# Patient Record
Sex: MALE | Race: WHITE | Employment: STUDENT | URBAN - METROPOLITAN AREA
[De-identification: names, ages, dates, MRNs, and addresses within clinical notes are randomized per-mention and may not be internally consistent; named-entity substitution may affect disease eponyms.]

---
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
## 2020-03-19 DIAGNOSIS — F90.0 ADHD (ATTENTION DEFICIT HYPERACTIVITY DISORDER), INATTENTIVE TYPE: Primary | ICD-10-CM

## 2020-03-19 NOTE — TELEPHONE ENCOUNTER
Patient is calling requesting a refill on the following med. Pharm on file verified.      Requested Prescriptions     Pending Prescriptions Disp Refills    dextroamphetamine-amphetamine (ADDERALL) 20 mg tablet 45 Tab 0     Sig: Take 1/2 to 1 tablet once or twice daily for ADHD symptoms

## 2020-03-23 RX ORDER — DEXTROAMPHETAMINE SACCHARATE, AMPHETAMINE ASPARTATE, DEXTROAMPHETAMINE SULFATE AND AMPHETAMINE SULFATE 5; 5; 5; 5 MG/1; MG/1; MG/1; MG/1
TABLET ORAL
Qty: 30 TAB | Refills: 0 | Status: SHIPPED | OUTPATIENT
Start: 2020-03-23

## 2020-03-23 NOTE — TELEPHONE ENCOUNTER
Thanks. Advise pt he failed to show for his follow up and that I wanted to see him on the new dose because we made a change and he was also having some wt loss on this medication. Advise I do need to see him.  The good thing is he apparently doesn't take it much because he has not filled it since . (DAISY, note to self: I sent in smaller supply for now w/ adjusted sig this time until I can evaluate at next appt)

## 2020-03-23 NOTE — TELEPHONE ENCOUNTER
No problem on , last filled 11/14/19, LOV 11/14/19, due f/u in 4 weeks and FTS on 12/17/19.   last UDS 10/15/19, CSA same day

## 2020-04-20 ENCOUNTER — VIRTUAL VISIT (OUTPATIENT)
Dept: FAMILY MEDICINE CLINIC | Age: 20
End: 2020-04-20

## 2020-04-20 VITALS — WEIGHT: 141 LBS | BODY MASS INDEX: 20.23 KG/M2

## 2020-04-20 DIAGNOSIS — Z51.81 ENCOUNTER FOR MEDICATION MONITORING: ICD-10-CM

## 2020-04-20 DIAGNOSIS — F90.0 ADHD (ATTENTION DEFICIT HYPERACTIVITY DISORDER), INATTENTIVE TYPE: Primary | ICD-10-CM

## 2020-04-20 NOTE — PATIENT INSTRUCTIONS
Attention Deficit Hyperactivity Disorder (ADHD) in Adults: Care Instructions Your Care Instructions Attention deficit hyperactivity disorder, or ADHD, is a condition that makes it hard to pay attention. So you may have problems when you try to focus, get organized, and finish tasks. It might make you more active than other people. Or you might do things without thinking first. 
ADHD is very common. It usually starts in early childhood. Many adults don't realize they have it until their children are diagnosed. Then they become aware of their own symptoms. Doctors don't know what causes ADHD. But it often runs in families. ADHD can be treated with medicines, behavior training, and counseling. Treatment can improve your life. Follow-up care is a key part of your treatment and safety. Be sure to make and go to all appointments, and call your doctor if you are having problems. It's also a good idea to know your test results and keep a list of the medicines you take. How can you care for yourself at home? · Learn all you can about ADHD. This will help you and your family understand it better. · Take your medicines exactly as prescribed. Call your doctor if you think you are having a problem with your medicine. You will get more details on the specific medicines your doctor prescribes. · If you miss a dose of your medicine, do not take an extra dose. · If your doctor suggests counseling, find a counselor you like and trust. Talk openly and honestly. Be willing to make some changes. · Find a support group for adults with ADHD. Talking to others with the same problems can help you feel better. It can also give you ideas about how to best cope with the condition. · Get rid of distractions at your work space. Keep your desk clean. Try not to face a window or busy hallway. · Use files, planners, and other tools to keep you organized. · Limit use of alcohol, and do not use illegal drugs.  People with ADHD tend to develop substance use disorder more easily than others. Tell your doctor if you need help to quit. Counseling, support groups, and sometimes medicines can help you stay free of alcohol or drugs. · Get at least 30 minutes of physical activity on most days of the week. Exercise has been shown to help people cope with ADHD. Walking is a good choice. You also may want to do other activities, such as running, swimming, cycling, or playing tennis or team sports. When should you call for help? Watch closely for changes in your health, and be sure to contact your doctor if: 
  · You feel sad a lot or cry all the time.  
  · You have trouble sleeping, or you sleep too much.  
  · You find it hard to concentrate, make decisions, or remember things.  
  · You change how you normally eat.  
  · You feel guilty for no reason. Where can you learn more? Go to http://celena-vanesa.info/ Enter B196 in the search box to learn more about \"Attention Deficit Hyperactivity Disorder (ADHD) in Adults: Care Instructions. \" Current as of: May 28, 2019Content Version: 12.4 © 9006-5819 [employer]. Care instructions adapted under license by [employer] (which disclaims liability or warranty for this information). If you have questions about a medical condition or this instruction, always ask your healthcare professional. [address] any warranty or liability for your use of this information.

## 2020-04-20 NOTE — PROGRESS NOTES
Chief Complaint   Patient presents with    Attention Deficit Disorder      Follow UpA    Medication Evaluation     Adderall rx Mgt  Last UDS 10/15/19  CSA  10/15/19       HISTORY OF PRESENT ILLNESS   HPI  Follow up ADHD and medication check on Adderall 20 mg. Since transitioning from the Adderall XR 20 mg once daily to the Adderall IR 20 mg bid prn, this has been working much better for him and he is tolerating it better. His appetite is much improved and his wt has picked back up a few lbs. Denies dry mouth, constipation, chest pain, palpitations, constipation, insomnia as well. He is in his last month of the spring semester of his freshman year at [employer]. Due to current covid pandemic, his school has transitioned to online courses from home since 4/3. He states this was an adjustment but he is doing well so far. He has classes scheduled pretty much every day but only needs to take the Adderall 1/2 tablet ~ 3-4 days a week and occasionally takes a second half in the afternoon at 12 or 1 pm if he has extra work to do. Overall feels the medication is working well for him, helping him to stay focused, attentive, productive for lectures, assignments, quizzes, tests, papers and being functional/organized in and outside of class and for home. Classes end ~ 5-16-20. He plans to work this summer but is not sure that he would necessarily need the Adderall for work. He will let me know if he finds that he does. Feeling well, no complaints at this time. REVIEW OF SYMPTOMS   Review of Systems   Constitutional: Negative. Respiratory: Negative. Cardiovascular: Negative. Gastrointestinal: Negative. Neurological: Negative. Psychiatric/Behavioral: Negative.             PROBLEM LIST/MEDICAL HISTORY     Problem List  Date Reviewed: 3/23/2020          Codes Class Noted    ADHD (attention deficit hyperactivity disorder), inattentive type ICD-10-CM: F90.0  ICD-9-CM: 314.00  10/15/2019    Overview Signed 10/15/2019 11:48 AM by Dakota Chris MD     Testing done 6th grade 2011 age 6: ADHD w/ Impulsivity, treated w/ medication 6th/7th grade; Re-tested 2017 age 12: ADHD Inattentive Type, no medications; PCP [facility] : Started medications             Dyslexia ICD-10-CM: R48.0  ICD-9-CM: 784.61  10/15/2019    Overview Signed 10/15/2019 11:54 AM by Dakota Chris MD     Formal testing in school and psychological evaluation done 2017 for IEP             Allergic rhinitis due to dust mite ICD-10-CM: J30.89  ICD-9-CM: 477.8  10/10/2017    Overview Addendum 12/4/2017 12:54 PM by Dakota Chris MD     Allergy Testing (skin test) [facility] Summer 2017: Dr Ling Lopez             Reactive airway disease ICD-10-CM: J45.909  ICD-9-CM: 493.90  10/10/2017    Overview Addendum 12/4/2017 12:55 PM by MD [facility] 11-30-17: Dr. Soundra Landau: Spirometry +             History of allergic rhinitis ICD-10-CM: Z87.09  ICD-9-CM: V12.69  12/24/2015                  PAST SURGICAL HISTORY     Past Surgical History:   Procedure Laterality Date    HX HEENT      oral surgery    HX TONSILLECTOMY  03/09/2018         MEDICATIONS     Current Outpatient Medications   Medication Sig    dextroamphetamine-amphetamine (ADDERALL) 20 mg tablet Take 1/2 to 1 tablet once daily as needed for ADHD symptoms    PROAIR HFA 90 mcg/actuation inhaler INHALE 2 PUFFS EVERY 6 HOURS AS NEEDED FOR WHEEZE     No current facility-administered medications for this visit.            ALLERGIES     Allergies   Allergen Reactions    Pcn [Penicillins] Hives          SOCIAL HISTORY     Social History     Socioeconomic History    Marital status: SINGLE     Spouse name: Not on file    Number of children: Not on file    Years of education: Not on file    Highest education level: Not on file   Occupational History    Occupation: Student-Graduated from [employer] Occupation: Started [employer] at [employer] 8/2019   Tobacco Use    Smoking status: Never Smoker    Smokeless tobacco: Never Used   Substance and Sexual Activity    Alcohol use: No    Drug use: No    Sexual activity: Yes     Partners: Female     Birth control/protection: Condom   Other Topics Concern    Caffeine Concern No    Special Diet No   Social History Narrative    Transferred from [employer] in the Fall 2014/ 8th grade to [email or web address] (6th through 12th grade) on [facility] for more of a challenge academically; 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        IMMUNIZATIONS  Immunization History   Administered Date(s) Administered    DTaP 01/29/2001, 04/05/2001, 06/14/2001, 03/06/2002, 01/17/2006    Hep A Vaccine 06/01/2009    Hep A Vaccine 2 Dose Schedule (Ped/Adol) 03/14/2013    Hep B Vaccine 01/29/2001, 02/26/2001, 09/12/2001    Hib 01/29/2001, 04/05/2001, 06/14/2001, 03/06/2002    IPV 04/05/2001, 06/14/2001, 01/17/2006    MMR 03/06/2002, 01/17/2006    Meningococcal (MCV4O) Vaccine 08/26/2019    Meningococcal ACWY Vaccine 03/14/2013    Pneumococcal Conjugate (PCV-13) 04/05/2001, 06/14/2001, 12/11/2001    Pneumococcal Vaccine (Unspecified Type) 01/29/2001    Poliovirus vaccine 01/29/2001    Tdap 03/30/2012    Varicella Virus Vaccine 12/11/2001, 06/01/2009         FAMILY HISTORY     Family History   Problem Relation Age of Onset    Hypertension Father     High Cholesterol Father         elevated TG's    Attention Deficit Disorder Father     Attention Deficit Hyperactivity Disorder Brother     Hypertension Paternal Grandmother     Migraines Paternal Grandmother     Hypertension Paternal Grandfather     Dementia Paternal Grandfather     Thyroid Disease Paternal Aunt          VITALS     Visit Vitals  Wt 141 lb (64 kg)   BMI 20.23 kg/m²          PHYSICAL EXAMINATION   Physical Exam  Constitutional:       General: He is not in acute distress. Appearance: Normal appearance. Pulmonary:      Effort: Pulmonary effort is normal. No respiratory distress. Neurological:      Mental Status: He is alert and oriented to person, place, and time. Mental status is at baseline. Psychiatric:         Mood and Affect: Mood normal.         Behavior: Behavior normal.         Thought Content: Thought content normal.         Judgment: Judgment normal.             DIAGNOSTIC DATA         LABORATORY DATA     Results for orders placed or performed in visit on 10/15/19   TOXASSURE SELECT 15 (MW)   Result Value Ref Range    Summary FINAL             ASSESSMENT & PLAN       ICD-10-CM ICD-9-CM    1. ADHD (attention deficit hyperactivity disorder), inattentive type F90.0 314.00    2. Encounter for medication monitoring Z51.81 V58.83      Doing well on current regimen of Adderall 20 mg, 1/2 tab bid prn  ADHD and medication mgt counseling w/ pt today   pulled and reviewed. No problems noted. Low abuse/misuse potential.   UDS and CSA on file . Patient counseled and we discussed controlled medications, effects, side effects, indications, risks vs benefits, precautions, potential interactions/dangers w/ other medications, illicit drugs, abuse potential and the possible negative health implications/risks associated w/ overuse/abuse/misuse of controlled stimulant or sedating medications including overdose and death. Patient expressed understanding and agreement with current plan of care. Follow up 6months, sooner prn    -------------------------------------------------------------------------------------------------------------------------  Patient is being evaluated by a video visit encounter for concerns as above. A caregiver was present when appropriate. Due to this being a TeleHealth encounter (During WZFBT-30 public health emergency), evaluation of the following organ systems was limited: Vitals/Constitutional/EENT/Resp/CV/GI//MS/Neuro/Skin/Heme-Lymph-Imm.   Pursuant to the emergency declaration under the [address] [place] [address] authority and the [employer] Act, this Virtual  Visit was conducted, with patient's (and/or legal guardian's) consent, to reduce the patient's risk of exposure to COVID-19 and provide necessary medical care. Services were provided through a video synchronous discussion virtually to substitute for in-person clinic visit. Patient was located at their own home. I was at home while conducting this encounter. Consent:  He and/or his healthcare decision maker is aware that this patient-initiated Telehealth encounter is a billable service, with coverage as determined by his insurance carrier. He is aware that he may receive a bill and has provided verbal consent to proceed: Yes    This virtual visit was conducted via [employer]. Pursuant to the emergency declaration under the [address] [place] [address], [address] [place] [address] authority and the [employer] and [employer] Act, this Virtual  Visit was conducted to reduce the patient's risk of exposure to COVID-19 and provide continuity of care for an established patient. Services were provided through a video synchronous discussion virtually to substitute for in-person clinic visit. Due to this being a TeleHealth evaluation, many elements of the physical examination are unable to be assessed. Total Time: minutes: 11-20 minutes.

## 2020-04-20 NOTE — PROGRESS NOTES
Chief Complaint   Patient presents with    Medication Evaluation       Adderall refills. last UDS 10/15/19   CSA  10/15/19     \"REVIEWED RECORD IN PREPARATION FOR VISIT AND HAVE OBTAINED THE NECESSARY DOCUMENTATION\"  1. Have you been to the ER, urgent care clinic since your last visit? Hospitalized since your last visit? No    2. Have you seen or consulted any other health care providers outside of the [address] [place] [address] since your last visit? Include any pap smears or colon screening.  No

## 2023-01-29 NOTE — TELEPHONE ENCOUNTER
----- Message from Leopoldo Madison sent at 1/24/2017  5:46 PM EST -----  Regarding: Any/Telephone   Pt's father requests a call to r/s appt that was cancelled by the provider for 1/27/17.  Please [phone]
----- Message from Percy Koch sent at 1/24/2017  7:09 PM EST -----  Regarding: Dr. Christiano Leonardo  Pt appointment was canceled by the provider. Pt does not wish to wait until Feb the 6th to be scheduled. No appointment within his time frame and he wishes to be fit in to the schedule sooner. This Thursday at 10am will not work with the pt's schedule.  Pt requests an appt between 8-9 am and after 3pm.   Best contact: [phone]
----- Message from Ruthanne Hodgkin sent at 1/24/2017  7:09 PM EST -----  Regarding: Dr. Amrik Sanchez  Pt appointment was canceled by the provider. Pt does not wish to wait until Feb the 6th to be scheduled. No appointment within his time frame and he wishes to be fit in to the schedule sooner. This Thursday at 10am will not work with the pt's schedule.  Pt requests an appt between 8-9 am and after 3pm.   Best contact: [phone]
DENIZ for return call  LM
Patient would like to speak with the nurse regarding this.
Several attempts to reach pt's father without a return call.
The patient's father was called back yesterday and would like for a nurse to call him back, he stated that he wanted the nurse to \"fit the patient in\" and I advised him you all have the same schedule as us, he still would like a call back from the nurse.
Follow Instructions Provided by your Surgical Team